# Patient Record
(demographics unavailable — no encounter records)

---

## 2024-12-20 NOTE — HISTORY OF PRESENT ILLNESS
[FreeTextEntry1] : Jess is a pleasant 83-year-old female who presents today status post surgical management on 5/20/24 and continues to progress. She is doing very well at this point and is delighted.  She describes tremendous improvement with regard to right hand stiffness and swelling.  She has continued to work with therapy and on her home exercise program.  Pain is well-controlled.

## 2024-12-20 NOTE — PHYSICAL EXAM
[de-identified] : Examination of the [right] wrist reveals a stable DRUJ joint. She tolerates pronation and supination quite well. She has intact extension at all digits with mild 10 degree lag at the small MCP. She is around 1 cm DPC which can be improved upon passively.

## 2024-12-20 NOTE — ASSESSMENT
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 At this point in time she has done well very postoperatively.  Her swelling and stiffness has decreased tremendously, she tolerates pronation and supination of the right wrist well with minimal to no discomfort. Active extension of all digits of the right hand intact.  Her range of motion has also improved significantly. She will continue working on finger flexion. We have discussed possibility of chronic stiffness. She will continue with occupational therapy and HEP to further optimize her outcome.  She is doing very well this time, her condition is stable.   The patient was adequately and thoroughly informed of my assessment of their current condition(s).  - This may diminish bodily function for the extremity.  We discussed prognosis, treatment modalities including operative and nonoperative options for the above diagnostic assessment. As always, 2nd opinion is always provided as an option. For this, when accessible, I was able to review other physicians note(s) including reviewing other tests, imaging results as well as personally view these results for my own interpretation.      The patient was adequately and thoroughly informed of my assessment of their current condition(s).   DISCUSSION: 1.  Continue OT and HEP.   Activity modifications.  She is doing very well at this time, her condition is stable. 2. [x] 3. [x]